# Patient Record
Sex: MALE | Race: WHITE | URBAN - METROPOLITAN AREA
[De-identification: names, ages, dates, MRNs, and addresses within clinical notes are randomized per-mention and may not be internally consistent; named-entity substitution may affect disease eponyms.]

---

## 2023-03-09 ENCOUNTER — OFFICE VISIT (OUTPATIENT)
Dept: URBAN - METROPOLITAN AREA CLINIC 51 | Facility: CLINIC | Age: 73
End: 2023-03-09
Payer: MEDICARE

## 2023-03-09 DIAGNOSIS — H43.813 VITREOUS DEGENERATION, BILATERAL: ICD-10-CM

## 2023-03-09 DIAGNOSIS — H25.811 COMBINED FORMS OF AGE-RELATED CATARACT, RIGHT EYE: Primary | ICD-10-CM

## 2023-03-09 DIAGNOSIS — E11.9 TYPE 2 DIABETES MELLITUS WITHOUT COMPLICATIONS: ICD-10-CM

## 2023-03-09 DIAGNOSIS — Z79.84 LONG TERM (CURRENT) USE OF ORAL ANTIDIABETIC DRUGS: ICD-10-CM

## 2023-03-09 DIAGNOSIS — H25.12 AGE-RELATED NUCLEAR CATARACT, LEFT EYE: ICD-10-CM

## 2023-03-09 DIAGNOSIS — H17.9 CORNEAL SCAR: ICD-10-CM

## 2023-03-09 PROCEDURE — 92134 CPTRZ OPH DX IMG PST SGM RTA: CPT | Performed by: OPTOMETRIST

## 2023-03-09 PROCEDURE — 92250 FUNDUS PHOTOGRAPHY W/I&R: CPT | Performed by: OPTOMETRIST

## 2023-03-09 PROCEDURE — 92004 COMPRE OPH EXAM NEW PT 1/>: CPT | Performed by: OPTOMETRIST

## 2023-03-09 ASSESSMENT — KERATOMETRY
OD: 45.38
OS: 45.75

## 2023-03-09 ASSESSMENT — INTRAOCULAR PRESSURE
OS: 15
OD: 14

## 2023-03-09 ASSESSMENT — VISUAL ACUITY
OD: 20/25
OS: 20/20

## 2023-03-09 NOTE — IMPRESSION/PLAN
Impression: Type 2 diabetes mellitus without complications: R07.2. Plan: No diabetic retinopathy observed on today's examination. Discussed the importance of annual diabetic eye exams. Discussed with patient importance of good blood sugar control. Recommend continue f/u care for DM control with PCP. Send report to PCP. Monitor annually with MAC OCT.

## 2023-03-09 NOTE — IMPRESSION/PLAN
Impression: Combined forms of age-related cataract, right eye: H25.811. Plan: Educated patient on condition and findings OD>OS. Cataracts are not visually significant enough to warrant surgical intervention at this time. Patient will monitor vision closely and contact our office with any changes/ worsening in vision. Will re-evaluate on return visit.

## 2023-04-21 NOTE — IMPRESSION/PLAN
Impression: Age-related nuclear cataract, left eye: H25.12. Plan: Educated patient on condition and findings OD>OS. Cataracts are not visually significant enough to warrant surgical intervention at this time. Patient will monitor vision closely and contact our office with any changes/ worsening in vision. Will re-evaluate on return visit. Needs TSH

## 2025-02-20 ENCOUNTER — OFFICE VISIT (OUTPATIENT)
Dept: URBAN - METROPOLITAN AREA CLINIC 51 | Facility: CLINIC | Age: 75
End: 2025-02-20
Payer: COMMERCIAL

## 2025-02-20 DIAGNOSIS — H25.813 COMBINED FORMS OF AGE-RELATED CATARACT, BILATERAL: ICD-10-CM

## 2025-02-20 DIAGNOSIS — H43.813 VITREOUS DEGENERATION, BILATERAL: ICD-10-CM

## 2025-02-20 DIAGNOSIS — E11.9 DIABETES MELLITUS TYPE 2 WITHOUT MENTION OF COMPLICATION: Primary | ICD-10-CM

## 2025-02-20 DIAGNOSIS — H04.123 DRY EYE SYNDROME OF BILATERAL LACRIMAL GLANDS: ICD-10-CM

## 2025-02-20 DIAGNOSIS — Z79.84 LONG TERM (CURRENT) USE OF ORAL HYPOGLYCEMIC DRUGS: ICD-10-CM

## 2025-02-20 DIAGNOSIS — H52.4 PRESBYOPIA: ICD-10-CM

## 2025-02-20 PROCEDURE — 92014 COMPRE OPH EXAM EST PT 1/>: CPT

## 2025-02-20 PROCEDURE — 92134 CPTRZ OPH DX IMG PST SGM RTA: CPT

## 2025-02-20 ASSESSMENT — INTRAOCULAR PRESSURE
OS: 10
OD: 11

## 2025-02-20 ASSESSMENT — KERATOMETRY
OD: 45.38
OS: 45.13

## 2025-02-20 ASSESSMENT — VISUAL ACUITY
OS: 20/20
OD: 20/20